# Patient Record
Sex: MALE | Race: WHITE | Employment: FULL TIME | URBAN - METROPOLITAN AREA
[De-identification: names, ages, dates, MRNs, and addresses within clinical notes are randomized per-mention and may not be internally consistent; named-entity substitution may affect disease eponyms.]

---

## 2022-10-28 ENCOUNTER — APPOINTMENT (OUTPATIENT)
Dept: GENERAL RADIOLOGY | Age: 42
End: 2022-10-28
Payer: COMMERCIAL

## 2022-10-28 ENCOUNTER — HOSPITAL ENCOUNTER (OUTPATIENT)
Age: 42
Setting detail: OBSERVATION
Discharge: HOME | End: 2022-10-30
Attending: EMERGENCY MEDICINE | Admitting: INTERNAL MEDICINE
Payer: COMMERCIAL

## 2022-10-28 DIAGNOSIS — R73.9 HYPERGLYCEMIA: ICD-10-CM

## 2022-10-28 DIAGNOSIS — R07.9 CHEST PAIN, UNSPECIFIED TYPE: Primary | ICD-10-CM

## 2022-10-28 DIAGNOSIS — E78.00 HIGH CHOLESTEROL: ICD-10-CM

## 2022-10-28 DIAGNOSIS — I10 HYPERTENSION, UNSPECIFIED TYPE: ICD-10-CM

## 2022-10-28 DIAGNOSIS — Z72.0 TOBACCO ABUSE: ICD-10-CM

## 2022-10-28 LAB
A/G RATIO: 1.9 (ref 1.1–2.2)
ALBUMIN SERPL-MCNC: 4.3 G/DL (ref 3.4–5)
ALP BLD-CCNC: 63 U/L (ref 40–129)
ALT SERPL-CCNC: 16 U/L (ref 10–40)
ANION GAP SERPL CALCULATED.3IONS-SCNC: 12 MMOL/L (ref 3–16)
AST SERPL-CCNC: 18 U/L (ref 15–37)
BASOPHILS ABSOLUTE: 0.1 K/UL (ref 0–0.2)
BASOPHILS RELATIVE PERCENT: 0.5 %
BILIRUB SERPL-MCNC: 0.4 MG/DL (ref 0–1)
BUN BLDV-MCNC: 13 MG/DL (ref 7–20)
CALCIUM SERPL-MCNC: 8.5 MG/DL (ref 8.3–10.6)
CHLORIDE BLD-SCNC: 104 MMOL/L (ref 99–110)
CO2: 23 MMOL/L (ref 21–32)
CREAT SERPL-MCNC: 0.9 MG/DL (ref 0.9–1.3)
EKG ATRIAL RATE: 76 BPM
EKG DIAGNOSIS: NORMAL
EKG P AXIS: 0 DEGREES
EKG P-R INTERVAL: 134 MS
EKG Q-T INTERVAL: 388 MS
EKG QRS DURATION: 96 MS
EKG QTC CALCULATION (BAZETT): 436 MS
EKG R AXIS: 6 DEGREES
EKG T AXIS: 1 DEGREES
EKG VENTRICULAR RATE: 76 BPM
EOSINOPHILS ABSOLUTE: 0 K/UL (ref 0–0.6)
EOSINOPHILS RELATIVE PERCENT: 0.4 %
GFR SERPL CREATININE-BSD FRML MDRD: >60 ML/MIN/{1.73_M2}
GLUCOSE BLD-MCNC: 125 MG/DL (ref 70–99)
HCT VFR BLD CALC: 47.6 % (ref 40.5–52.5)
HEMOGLOBIN: 16.2 G/DL (ref 13.5–17.5)
INFLUENZA A: NOT DETECTED
INFLUENZA B: NOT DETECTED
LYMPHOCYTES ABSOLUTE: 1.9 K/UL (ref 1–5.1)
LYMPHOCYTES RELATIVE PERCENT: 17.9 %
MCH RBC QN AUTO: 32.6 PG (ref 26–34)
MCHC RBC AUTO-ENTMCNC: 34 G/DL (ref 31–36)
MCV RBC AUTO: 95.9 FL (ref 80–100)
MONOCYTES ABSOLUTE: 0.5 K/UL (ref 0–1.3)
MONOCYTES RELATIVE PERCENT: 4.9 %
NEUTROPHILS ABSOLUTE: 8.2 K/UL (ref 1.7–7.7)
NEUTROPHILS RELATIVE PERCENT: 76.3 %
PDW BLD-RTO: 12.8 % (ref 12.4–15.4)
PLATELET # BLD: 239 K/UL (ref 135–450)
PMV BLD AUTO: 8.7 FL (ref 5–10.5)
POTASSIUM REFLEX MAGNESIUM: 3.7 MMOL/L (ref 3.5–5.1)
PRO-BNP: 25 PG/ML (ref 0–124)
RBC # BLD: 4.97 M/UL (ref 4.2–5.9)
REASON FOR REJECTION: NORMAL
REJECTED TEST: NORMAL
SARS-COV-2 RNA, RT PCR: NOT DETECTED
SODIUM BLD-SCNC: 139 MMOL/L (ref 136–145)
TOTAL PROTEIN: 6.6 G/DL (ref 6.4–8.2)
TROPONIN: <0.01 NG/ML
TROPONIN: <0.01 NG/ML
WBC # BLD: 10.7 K/UL (ref 4–11)

## 2022-10-28 PROCEDURE — 84484 ASSAY OF TROPONIN QUANT: CPT

## 2022-10-28 PROCEDURE — 99285 EMERGENCY DEPT VISIT HI MDM: CPT

## 2022-10-28 PROCEDURE — G0378 HOSPITAL OBSERVATION PER HR: HCPCS

## 2022-10-28 PROCEDURE — 80053 COMPREHEN METABOLIC PANEL: CPT

## 2022-10-28 PROCEDURE — 87636 SARSCOV2 & INF A&B AMP PRB: CPT

## 2022-10-28 PROCEDURE — 6370000000 HC RX 637 (ALT 250 FOR IP): Performed by: PHYSICIAN ASSISTANT

## 2022-10-28 PROCEDURE — 2580000003 HC RX 258: Performed by: NURSE PRACTITIONER

## 2022-10-28 PROCEDURE — 83880 ASSAY OF NATRIURETIC PEPTIDE: CPT

## 2022-10-28 PROCEDURE — 93010 ELECTROCARDIOGRAM REPORT: CPT | Performed by: INTERNAL MEDICINE

## 2022-10-28 PROCEDURE — 6370000000 HC RX 637 (ALT 250 FOR IP): Performed by: NURSE PRACTITIONER

## 2022-10-28 PROCEDURE — 85025 COMPLETE CBC W/AUTO DIFF WBC: CPT

## 2022-10-28 PROCEDURE — 71045 X-RAY EXAM CHEST 1 VIEW: CPT

## 2022-10-28 PROCEDURE — 93005 ELECTROCARDIOGRAM TRACING: CPT | Performed by: EMERGENCY MEDICINE

## 2022-10-28 RX ORDER — NICOTINE 21 MG/24HR
1 PATCH, TRANSDERMAL 24 HOURS TRANSDERMAL DAILY
Status: DISCONTINUED | OUTPATIENT
Start: 2022-10-28 | End: 2022-10-30 | Stop reason: HOSPADM

## 2022-10-28 RX ORDER — ATORVASTATIN CALCIUM 10 MG/1
10 TABLET, FILM COATED ORAL DAILY
Status: ON HOLD | COMMUNITY
End: 2022-10-30 | Stop reason: HOSPADM

## 2022-10-28 RX ORDER — SODIUM CHLORIDE 9 MG/ML
INJECTION, SOLUTION INTRAVENOUS PRN
Status: DISCONTINUED | OUTPATIENT
Start: 2022-10-28 | End: 2022-10-30 | Stop reason: HOSPADM

## 2022-10-28 RX ORDER — ACETAMINOPHEN 650 MG/1
650 SUPPOSITORY RECTAL EVERY 6 HOURS PRN
Status: DISCONTINUED | OUTPATIENT
Start: 2022-10-28 | End: 2022-10-30 | Stop reason: HOSPADM

## 2022-10-28 RX ORDER — ATENOLOL 25 MG/1
50 TABLET ORAL DAILY
Status: DISCONTINUED | OUTPATIENT
Start: 2022-10-28 | End: 2022-10-30 | Stop reason: HOSPADM

## 2022-10-28 RX ORDER — PAROXETINE HYDROCHLORIDE 20 MG/1
40 TABLET, FILM COATED ORAL EVERY MORNING
Status: DISCONTINUED | OUTPATIENT
Start: 2022-10-29 | End: 2022-10-30 | Stop reason: HOSPADM

## 2022-10-28 RX ORDER — PILOCARPINE HYDROCHLORIDE 12.5 MG/ML
SOLUTION/ DROPS OPHTHALMIC
COMMUNITY

## 2022-10-28 RX ORDER — SODIUM CHLORIDE 0.9 % (FLUSH) 0.9 %
5-40 SYRINGE (ML) INJECTION EVERY 12 HOURS SCHEDULED
Status: DISCONTINUED | OUTPATIENT
Start: 2022-10-28 | End: 2022-10-30 | Stop reason: HOSPADM

## 2022-10-28 RX ORDER — AMMONIUM LACTATE 12 G/100G
LOTION TOPICAL PRN
COMMUNITY

## 2022-10-28 RX ORDER — SODIUM CHLORIDE 0.9 % (FLUSH) 0.9 %
5-40 SYRINGE (ML) INJECTION PRN
Status: DISCONTINUED | OUTPATIENT
Start: 2022-10-28 | End: 2022-10-30 | Stop reason: HOSPADM

## 2022-10-28 RX ORDER — DIPHENHYDRAMINE HCL 25 MG
25 TABLET ORAL EVERY 6 HOURS PRN
Status: DISCONTINUED | OUTPATIENT
Start: 2022-10-28 | End: 2022-10-30 | Stop reason: HOSPADM

## 2022-10-28 RX ORDER — ASPIRIN 325 MG
325 TABLET ORAL ONCE
Status: COMPLETED | OUTPATIENT
Start: 2022-10-28 | End: 2022-10-28

## 2022-10-28 RX ORDER — ATORVASTATIN CALCIUM 10 MG/1
10 TABLET, FILM COATED ORAL DAILY
Status: DISCONTINUED | OUTPATIENT
Start: 2022-10-28 | End: 2022-10-29

## 2022-10-28 RX ORDER — PAROXETINE HYDROCHLORIDE 40 MG/1
40 TABLET, FILM COATED ORAL EVERY MORNING
COMMUNITY

## 2022-10-28 RX ORDER — POLYETHYLENE GLYCOL 3350 17 G/17G
17 POWDER, FOR SOLUTION ORAL DAILY PRN
Status: DISCONTINUED | OUTPATIENT
Start: 2022-10-28 | End: 2022-10-30 | Stop reason: HOSPADM

## 2022-10-28 RX ORDER — ACETAMINOPHEN 325 MG/1
650 TABLET ORAL EVERY 6 HOURS PRN
Status: DISCONTINUED | OUTPATIENT
Start: 2022-10-28 | End: 2022-10-30 | Stop reason: HOSPADM

## 2022-10-28 RX ORDER — ASPIRIN 81 MG/1
81 TABLET, CHEWABLE ORAL DAILY
Status: DISCONTINUED | OUTPATIENT
Start: 2022-10-29 | End: 2022-10-30 | Stop reason: HOSPADM

## 2022-10-28 RX ORDER — ATENOLOL 50 MG/1
50 TABLET ORAL DAILY
COMMUNITY

## 2022-10-28 RX ORDER — ONDANSETRON 4 MG/1
4 TABLET, ORALLY DISINTEGRATING ORAL EVERY 8 HOURS PRN
Status: DISCONTINUED | OUTPATIENT
Start: 2022-10-28 | End: 2022-10-30 | Stop reason: HOSPADM

## 2022-10-28 RX ORDER — ONDANSETRON 2 MG/ML
4 INJECTION INTRAMUSCULAR; INTRAVENOUS EVERY 6 HOURS PRN
Status: DISCONTINUED | OUTPATIENT
Start: 2022-10-28 | End: 2022-10-30 | Stop reason: HOSPADM

## 2022-10-28 RX ADMIN — NITROGLYCERIN 0.5 INCH: 20 OINTMENT TOPICAL at 18:15

## 2022-10-28 RX ADMIN — DIPHENHYDRAMINE HCL 25 MG: 25 TABLET ORAL at 22:42

## 2022-10-28 RX ADMIN — SODIUM CHLORIDE, PRESERVATIVE FREE 10 ML: 5 INJECTION INTRAVENOUS at 21:37

## 2022-10-28 RX ADMIN — ATENOLOL 50 MG: 25 TABLET ORAL at 21:32

## 2022-10-28 RX ADMIN — ASPIRIN 325 MG: 325 TABLET ORAL at 18:15

## 2022-10-28 RX ADMIN — ATORVASTATIN CALCIUM 10 MG: 10 TABLET, FILM COATED ORAL at 21:32

## 2022-10-28 ASSESSMENT — ENCOUNTER SYMPTOMS
DIARRHEA: 0
SORE THROAT: 0
VOMITING: 0
EYE DISCHARGE: 0
COUGH: 0
EYE REDNESS: 0
CONSTIPATION: 0
SINUS PRESSURE: 0
SHORTNESS OF BREATH: 0
CHEST TIGHTNESS: 0
NAUSEA: 0
RHINORRHEA: 0
ABDOMINAL PAIN: 0
SINUS PAIN: 0

## 2022-10-28 ASSESSMENT — HEART SCORE
ECG: 0
ECG: 1

## 2022-10-28 ASSESSMENT — PAIN SCALES - GENERAL
PAINLEVEL_OUTOF10: 6
PAINLEVEL_OUTOF10: 0

## 2022-10-28 NOTE — ED NOTES
Patient in bed resting upon room entry for hourly rounding. Patient repositioned for comfort: pillows readjusted around patient, and new warm blankets applied and straightened. No acute signs or symptoms of distress noted at this time. Respiratory status stable and VS WDL. Patient denies any needs, concerns, or complaints at this time. Call light within reach.      Bassam Solis RN  10/28/22 6060

## 2022-10-28 NOTE — ED PROVIDER NOTES
[address] ENCOUNTER        Pt Name: Ricci Schaefer  MRN: [id number]  [place] 1980  Date of evaluation: 10/28/2022  Provider: Maliha De Souza PA-C  PCP: No primary care provider on file. ED Attending: No att. providers found      This patient was seen and evaluated by the attending physician     I have not independently evaluated this patient. CHIEF COMPLAINT       Chief Complaint   Patient presents with    Chest Pain     Started around 1300, mid-chest with pressure. Describes dizziness with nausea. Wife describes the pt as \"looking yellow\". HISTORY OF PRESENT ILLNESS   (Location/Symptom, Timing/Onset, Context/Setting, Quality, Duration, Modifying Factors, Severity)  Note limiting factors. Ricci Schaefer is a 38 y.o. male with a pMH of HTN, HLD, tobacco abuse, pre-diabetes, presents for evaluation of sudden onset of chest pain, described as pressure, midsternal, associated diaphoresis and not feeling right. Symptoms have been intermittent since initial onset, however persistent squeezing tight pain has been felt. No treatment prior to arrival.  Patient advised that he had a stress test in the past but it has been numerous years ago. No recent illness. 3-4 drinks of bourbon daily. Nursing Notes were all reviewed and agreed with or any disagreements were addressed  in the HPI. REVIEW OF SYSTEMS  (2-9 systems for level 4, 10 or more for level 5)     Review of Systems   Constitutional:  Positive for diaphoresis. Negative for chills and fever. HENT: Negative. Negative for congestion, rhinorrhea, sinus pressure, sinus pain and sore throat. Eyes:  Negative for discharge, redness and visual disturbance. Respiratory:  Negative for cough, chest tightness and shortness of breath. Cardiovascular:  Positive for chest pain. Negative for palpitations. Gastrointestinal:  Negative for abdominal pain, constipation, diarrhea, nausea and vomiting. Genitourinary:  Negative for difficulty urinating, dysuria and frequency. Musculoskeletal: Negative. Skin: Negative. Neurological: Negative. Negative for dizziness, weakness, numbness and headaches. Psychiatric/Behavioral: Negative. All other systems reviewed and are negative. Positivesand Pertinent negatives as per HPI. Except as noted above in the ROS, all other systems were reviewed and negative. PAST MEDICAL HISTORY     Past Medical History:   Diagnosis Date    Depression     Hyperlipidemia     Hypertension          SURGICAL HISTORY       Past Surgical History:   Procedure Laterality Date    HERNIA REPAIR           CURRENT MEDICATIONS       Current Discharge Medication List        CONTINUE these medications which have NOT CHANGED    Details   atenolol (TENORMIN) 50 MG tablet Take 50 mg by mouth daily      PARoxetine (PAXIL) 40 MG tablet Take 40 mg by mouth every morning      Pilocarpine HCl (VUITY) 1.25 % SOLN Apply to eye      ammonium lactate (LAC-HYDRIN) 12 % lotion Apply topically as needed for Dry Skin Apply topically as needed. atorvastatin (LIPITOR) 10 MG tablet Take 10 mg by mouth daily               ALLERGIES     Patient has no known allergies. FAMILY HISTORY       Family History   Problem Relation Age of Onset    No Known Problems Mother     High Cholesterol Father     No Known Problems Brother     Lung Disease Maternal Grandfather     Heart Disease Paternal Grandmother          SOCIAL HISTORY       Social History     Socioeconomic History    Marital status:      Spouse name: None    Number of children: None    Years of education: None    Highest education level: None   Tobacco Use    Smoking status: Every Day     Packs/day: 0.50     Types: Cigarettes    Smokeless tobacco: Never   Substance and Sexual Activity    Alcohol use:  Yes     Alcohol/week: 4.0 standard drinks     Types: 4 Shots of liquor per week     Comment: 2 drinks per day-  double shot bourbons per day with a splash of cola (10/28/2022)    Drug use: Never       SCREENINGS     NIH Score       Glascow Lizzie Coma Scale  Eye Opening: Spontaneous  Best Verbal Response: Oriented  Best Motor Response: Obeys commands  Lizzie Coma Scale Score: 15    [facility]     Heart Score  Heart Score for chest pain patients  History: Highly Suspicious  ECG: Non-Specifc repolarization disturbance/LBTB/PM  Patient Age: < 45 years  *Risk factors for Atherosclerotic disease: Cigarette smoking, Diabetes Mellitus, Hypercholesterolemia, Hypertension, Obesity  Risk Factors: > 3 Risk factors or history of atherosclerotic disease*  Troponin: < 1X normal limit  Heart Score Total: 5      PHYSICAL EXAM    (up to 7 for level 4, 8 ormore for level 5)     ED Triage Vitals   BP Temp Temp Source Heart Rate Resp SpO2 Height Weight   10/28/22 1741 10/28/22 1741 10/28/22 1741 10/28/22 1741 10/28/22 1741 10/28/22 1741 10/28/22 1743 10/28/22 1743   (!) 165/98 99 °F (37.2 °C) Oral 76 16 99 % 6' 1\" (1.854 m) 235 lb (106.6 kg)       Physical Exam  Vitals and nursing note reviewed. Constitutional:       Appearance: Normal appearance. He is well-developed. He is not diaphoretic. HENT:      Head: Normocephalic and atraumatic. Nose: Nose normal.      Mouth/Throat:      Mouth: Mucous membranes are moist.      Pharynx: No oropharyngeal exudate. Eyes:      General:         Right eye: No discharge. Left eye: No discharge. Extraocular Movements: Extraocular movements intact. Conjunctiva/sclera: Conjunctivae normal.      Pupils: Pupils are equal, round, and reactive to light. Cardiovascular:      Rate and Rhythm: Normal rate and regular rhythm. Heart sounds: Normal heart sounds. No murmur heard. No friction rub. No gallop. Pulmonary:      Effort: Pulmonary effort is normal. No respiratory distress. Breath sounds: Normal breath sounds. No wheezing.    Abdominal:      General: Bowel sounds are normal. There is no distension. Palpations: Abdomen is soft. Tenderness: There is no abdominal tenderness. Musculoskeletal:         General: Normal range of motion. Cervical back: Normal range of motion. Skin:     General: Skin is warm and dry. Capillary Refill: Capillary refill takes less than 2 seconds. Neurological:      General: No focal deficit present. Mental Status: He is alert and oriented to person, place, and time. Psychiatric:         Mood and Affect: Mood normal.         Behavior: Behavior normal.      Comments: +anxious, diaphoretic        DIAGNOSTIC RESULTS   LABS:    Labs Reviewed   CBC WITH AUTO DIFFERENTIAL - Abnormal; Notable for the following components:       Result Value    Neutrophils Absolute 8.2 (*)     All other components within normal limits   COMPREHENSIVE METABOLIC PANEL W/ REFLEX TO MG FOR LOW K - Abnormal; Notable for the following components:    Glucose 125 (*)     All other components within normal limits   BASIC METABOLIC PANEL - Abnormal; Notable for the following components:    Glucose 115 (*)     All other components within normal limits   COVID-19 & INFLUENZA COMBO   SPECIMEN REJECTION    Narrative:     CALL  [facility] [phone],  Rejected Test Name/Called to: VELMA Ramirez, 10/28/2022 17:49, by ARON   TROPONIN   BRAIN NATRIURETIC PEPTIDE   TROPONIN   TROPONIN   TROPONIN   CBC   LIPID PANEL   HEMOGLOBIN A1C       All other labs were within normal range or notreturned as of this dictation. EKG: All EKG's are interpreted by the Emergency Department Physician who either signs or Co-signs this chart in the absence of a cardiologist.  Please see their note for interpretation of EKG. RADIOLOGY:   Interpretation per the Radiologist below, if available at the time of this note:    XR CHEST PORTABLE   Final Result   No radiographic evidence of an acute cardiopulmonary process.          NM Cardiac Stress Test Nuclear Imaging    (Results Pending)     XR CHEST PORTABLE    Result Date: 10/28/2022  EXAMINATION: ONE XRAY VIEW OF THE CHEST 10/28/2022 2:00 pm COMPARISON: None. HISTORY: ORDERING SYSTEM PROVIDED HISTORY: Chest Pain TECHNOLOGIST PROVIDED HISTORY: Reason for exam:->Chest Pain Reason for Exam: cp FINDINGS: The lungs and costophrenic angles are clear. The cardiomediastinal silhouette and pulmonary vessels appear normal.     No radiographic evidence of an acute cardiopulmonary process. Is this patient to be included in the SEP-1 Core Measure due to severe sepsis or septic shock?    No   Exclusion criteria - the patient is NOT to be included for SEP-1 Core Measure due to:  2+ SIRS criteria are not met     CONSULTS:  Hospitalist, accepts the patient      EMERGENCY DEPARTMENT COURSE and DIFFERENTIAL DIAGNOSIS/MDM:   Vitals:    Vitals:    10/29/22 0400 10/29/22 0539 10/29/22 0738 10/29/22 1218   BP:   120/84 136/89   Pulse:   62 70   Resp:   18    Temp:   98.1 °F (36.7 °C) 99 °F (37.2 °C)   TempSrc:   Oral Oral   SpO2:   100% 100%   Weight: 235 lb (106.6 kg) 235 lb (106.6 kg)     Height:           Patient was given the following medications:  Medications   atenolol (TENORMIN) tablet 50 mg (50 mg Oral Given 10/29/22 1031)   PARoxetine (PAXIL) tablet 40 mg (40 mg Oral Given 10/29/22 1032)   sodium chloride flush 0.9 % injection 5-40 mL (10 mLs IntraVENous Given 10/29/22 1034)   sodium chloride flush 0.9 % injection 5-40 mL (has no administration in time range)   0.9 % sodium chloride infusion (has no administration in time range)   ondansetron (ZOFRAN-ODT) disintegrating tablet 4 mg (has no administration in time range)     Or   ondansetron (ZOFRAN) injection 4 mg (has no administration in time range)   acetaminophen (TYLENOL) tablet 650 mg (650 mg Oral Given 10/29/22 0216)     Or   acetaminophen (TYLENOL) suppository 650 mg ( Rectal See Alternative 10/29/22 0216)   polyethylene glycol (GLYCOLAX) packet 17 g (has no administration in time range)   aspirin chewable tablet 81 mg (81 mg Oral Given 10/29/22 1032)   perflutren lipid microspheres (DEFINITY) injection 1.65 mg (has no administration in time range)   nicotine (NICODERM CQ) 14 MG/24HR 1 patch (1 patch TransDERmal Patch Applied 10/29/22 1033)   diphenhydrAMINE (BENADRYL) tablet 25 mg (25 mg Oral Given 10/28/22 2242)   pantoprazole (PROTONIX) tablet 40 mg (40 mg Oral Given 10/29/22 1031)   atorvastatin (LIPITOR) tablet 40 mg (has no administration in time range)   aspirin tablet 325 mg (325 mg Oral Given 10/28/22 1815)   nitroglycerin (NITRO-BID) 2 % ointment 0.5 inch (0.5 inches Topical Given 10/28/22 1815)   technetium tetrofosmin (Tc-MYOVIEW) injection 21.3 millicurie (15.4 millicuries IntraVENous Given 10/29/22 0800)   technetium tetrofosmin (Tc-MYOVIEW) injection 97.6 millicurie (71.5 millicuries IntraVENous Given 10/29/22 0915)     Evaluation of patient's past cardiac work-up includes a normal echo in 2014. Also per cardiologist note patient had a normal exercise stress test in 2014 however I am unable to find a report of this in care  everywhere. Afebrile, stable, patient presents to the ED for evaluation. Seen in conjunction with attending ED provider who agrees with assessment and plan. Patients SPO2 is 99% on room air they are not hypoxic. EKG shows normal sinus rhythm nonspecific T wave abnormality. Chest x-ray shows no evidence of any acute findings. Negative influenza negative COVID. Labs reveal no leukocytosis or anemia, no electrolyte abnormalities with the exception of hyperglycemia with a glucose of 125. No elevation in troponin or BNP. Patient was provided with aspirin and nitro ointment, which helps improve his symptoms. Due to patient's presentation of symptoms. I feel that he would benefit from admission for home telemetry monitoring and stress testing. His heart score is 5. Therefore consult to the hospitalist for admission of the patient in stable condition.   All questions are answered. The patient tolerated their visit well. They were seen and evaluated by the attendingphysician, No att. providers found who agreed with the assessment and plan. The patient and / or the family were informed of the results of any tests, a time was given to answer questions, a plan was proposed and they agreed Rodney Moy. FINAL IMPRESSION      1. Chest pain, unspecified type    2. Hypertension, unspecified type    3. High cholesterol    4. Tobacco abuse    5. Hyperglycemia          DISPOSITION/PLAN   DISPOSITION Admitted 10/28/2022 07:54:12 PM      PATIENT REFERRED TO:  No follow-up provider specified. DISCHARGE MEDICATIONS:  Current Discharge Medication List          DISCONTINUED MEDICATIONS:  Current Discharge Medication List                 Pt was seen during the COVID 19 pandemic. Appropriate PPE worn by ME during patient encounters. Pt seen during a time with constrained hospital bed capacity and other potential inpatient and outpatient resources were constrained due to the viral pandemic.    Please note that portions of this note were completed with a voice recognition program.  Efforts were made to edit the dictations but occasionally words are mis-transcribed.)    Evelyne Vega PA-C (electronically signed)        Evelyne Vega PA-C  10/29/22 Montez Nice PA-C  10/29/22 1448

## 2022-10-29 ENCOUNTER — APPOINTMENT (OUTPATIENT)
Dept: NUCLEAR MEDICINE | Age: 42
End: 2022-10-29
Payer: COMMERCIAL

## 2022-10-29 PROBLEM — F10.20 ALCOHOL DEPENDENCE, DAILY USE (HCC): Status: ACTIVE | Noted: 2022-10-29

## 2022-10-29 PROBLEM — I10 HYPERTENSION: Status: ACTIVE | Noted: 2022-10-29

## 2022-10-29 PROBLEM — E78.00 HIGH CHOLESTEROL: Status: ACTIVE | Noted: 2022-10-29

## 2022-10-29 PROBLEM — Z72.0 NICOTINE USE: Status: ACTIVE | Noted: 2022-10-29

## 2022-10-29 LAB
ANION GAP SERPL CALCULATED.3IONS-SCNC: 11 MMOL/L (ref 3–16)
BUN BLDV-MCNC: 11 MG/DL (ref 7–20)
CALCIUM SERPL-MCNC: 9.4 MG/DL (ref 8.3–10.6)
CHLORIDE BLD-SCNC: 104 MMOL/L (ref 99–110)
CO2: 26 MMOL/L (ref 21–32)
CREAT SERPL-MCNC: 1 MG/DL (ref 0.9–1.3)
EKG ATRIAL RATE: 62 BPM
EKG DIAGNOSIS: NORMAL
EKG P AXIS: -4 DEGREES
EKG P-R INTERVAL: 124 MS
EKG Q-T INTERVAL: 406 MS
EKG QRS DURATION: 94 MS
EKG QTC CALCULATION (BAZETT): 412 MS
EKG R AXIS: 21 DEGREES
EKG T AXIS: 20 DEGREES
EKG VENTRICULAR RATE: 62 BPM
GFR SERPL CREATININE-BSD FRML MDRD: >60 ML/MIN/{1.73_M2}
GLUCOSE BLD-MCNC: 115 MG/DL (ref 70–99)
LV EF: 65 %
LVEF MODALITY: NORMAL
POTASSIUM SERPL-SCNC: 4.3 MMOL/L (ref 3.5–5.1)
SODIUM BLD-SCNC: 141 MMOL/L (ref 136–145)
TROPONIN: <0.01 NG/ML

## 2022-10-29 PROCEDURE — 99205 OFFICE O/P NEW HI 60 MIN: CPT | Performed by: INTERNAL MEDICINE

## 2022-10-29 PROCEDURE — 93005 ELECTROCARDIOGRAM TRACING: CPT | Performed by: NURSE PRACTITIONER

## 2022-10-29 PROCEDURE — 36415 COLL VENOUS BLD VENIPUNCTURE: CPT

## 2022-10-29 PROCEDURE — 80048 BASIC METABOLIC PNL TOTAL CA: CPT

## 2022-10-29 PROCEDURE — 6370000000 HC RX 637 (ALT 250 FOR IP): Performed by: INTERNAL MEDICINE

## 2022-10-29 PROCEDURE — 6370000000 HC RX 637 (ALT 250 FOR IP): Performed by: NURSE PRACTITIONER

## 2022-10-29 PROCEDURE — 84484 ASSAY OF TROPONIN QUANT: CPT

## 2022-10-29 PROCEDURE — 93017 CV STRESS TEST TRACING ONLY: CPT

## 2022-10-29 PROCEDURE — G0378 HOSPITAL OBSERVATION PER HR: HCPCS

## 2022-10-29 PROCEDURE — 3430000000 HC RX DIAGNOSTIC RADIOPHARMACEUTICAL: Performed by: NURSE PRACTITIONER

## 2022-10-29 PROCEDURE — 78452 HT MUSCLE IMAGE SPECT MULT: CPT

## 2022-10-29 PROCEDURE — 93010 ELECTROCARDIOGRAM REPORT: CPT | Performed by: INTERNAL MEDICINE

## 2022-10-29 PROCEDURE — 2580000003 HC RX 258: Performed by: NURSE PRACTITIONER

## 2022-10-29 PROCEDURE — A9502 TC99M TETROFOSMIN: HCPCS | Performed by: NURSE PRACTITIONER

## 2022-10-29 RX ORDER — PANTOPRAZOLE SODIUM 40 MG/1
40 TABLET, DELAYED RELEASE ORAL
Status: DISCONTINUED | OUTPATIENT
Start: 2022-10-29 | End: 2022-10-30 | Stop reason: HOSPADM

## 2022-10-29 RX ORDER — ATORVASTATIN CALCIUM 40 MG/1
40 TABLET, FILM COATED ORAL DAILY
Status: DISCONTINUED | OUTPATIENT
Start: 2022-10-30 | End: 2022-10-30 | Stop reason: HOSPADM

## 2022-10-29 RX ADMIN — PANTOPRAZOLE SODIUM 40 MG: 40 TABLET, DELAYED RELEASE ORAL at 10:31

## 2022-10-29 RX ADMIN — PAROXETINE HYDROCHLORIDE 40 MG: 20 TABLET, FILM COATED ORAL at 10:32

## 2022-10-29 RX ADMIN — TETROFOSMIN 30.3 MILLICURIE: 1.38 INJECTION, POWDER, LYOPHILIZED, FOR SOLUTION INTRAVENOUS at 09:15

## 2022-10-29 RX ADMIN — SODIUM CHLORIDE, PRESERVATIVE FREE 10 ML: 5 INJECTION INTRAVENOUS at 10:34

## 2022-10-29 RX ADMIN — ASPIRIN 81 MG 81 MG: 81 TABLET ORAL at 10:32

## 2022-10-29 RX ADMIN — ATENOLOL 50 MG: 25 TABLET ORAL at 10:31

## 2022-10-29 RX ADMIN — SODIUM CHLORIDE, PRESERVATIVE FREE 10 ML: 5 INJECTION INTRAVENOUS at 21:13

## 2022-10-29 RX ADMIN — ACETAMINOPHEN 650 MG: 325 TABLET ORAL at 02:16

## 2022-10-29 RX ADMIN — TETROFOSMIN 11.7 MILLICURIE: 1.38 INJECTION, POWDER, LYOPHILIZED, FOR SOLUTION INTRAVENOUS at 08:00

## 2022-10-29 ASSESSMENT — PAIN SCALES - GENERAL
PAINLEVEL_OUTOF10: 0
PAINLEVEL_OUTOF10: 7
PAINLEVEL_OUTOF10: 0

## 2022-10-29 ASSESSMENT — PAIN DESCRIPTION - DESCRIPTORS: DESCRIPTORS: ACHING

## 2022-10-29 ASSESSMENT — PAIN DESCRIPTION - LOCATION: LOCATION: HEAD

## 2022-10-29 NOTE — ED NOTES
Report called to nurse assuming inpatient care and responsibility for patient. All questions answered per protocol. Nurse receiving report expresses no further concerns or questions. Transport to be completed by ED staff.      Maicol Verdugo RN  10/28/22 2024

## 2022-10-29 NOTE — PROGRESS NOTES
Paged Dr. Roxi Hall regarding results of stress test. Return call from MD. Patient will stay until Monday for angiogram. Patient made aware.

## 2022-10-29 NOTE — CONSULTS
[facility]   Cardiology Consultation   Date: 10/29/2022  Reason for Consultation: Chest pain  Consult Requesting Physician: Ethel Elliott MD     Chief Complaint   Patient presents with    Chest Pain     Started around 1300, mid-chest with pressure. Describes dizziness with nausea. Wife describes the pt as \"looking yellow\". HPI: Ricci Schaefer is a 38 y.o. male with history of hypertension, hyperlipidemia, depression, smoker, was admitted due to chest pain. Patient was having discussion with a friend which became emotional and started having chest pressure in the middle of his sternum which lasted for a few hours. Pain would wax and wane through the day. His grandmother had a heart attack in her 46s. Troponins were negative. There was nonspecific changes on the EKG with ST ST segments. Past Medical History:   Diagnosis Date    Depression     Hyperlipidemia     Hypertension         Past Surgical History:   Procedure Laterality Date    HERNIA REPAIR         No Known Allergies    Social History:  Reviewed. reports that he has been smoking cigarettes. He has been smoking an average of .5 packs per day. He has never used smokeless tobacco. He reports current alcohol use of about 4.0 standard drinks per week. He reports that he does not use drugs. Family History:  Reviewed. family history includes Heart Disease in his paternal grandmother; High Cholesterol in his father; Lung Disease in his maternal grandfather; No Known Problems in his brother and mother. Review of System:  All other systems reviewed and are negative except for that noted above.  Pertinent negatives are:     General: negative for fever, chills   Ophthalmic ROS: negative for - eye pain or loss of vision  ENT ROS: negative for - headaches, sore throat   Respiratory: negative for - cough, sputum  Cardiovascular: Reviewed in HPI  Gastrointestinal: negative for - abdominal pain, diarrhea, N/V  Hematology: negative for - bleeding, blood clots, bruising or jaundice  Genito-Urinary:  negative for - Dysuria or incontinence  Musculoskeletal: negative for - Joint swelling, muscle pain  Neurological: negative for - confusion, dizziness, headaches   Psychiatric: No anxiety, no depression. Dermatological: negative for - rash    Physical Examination:  Vitals:    10/29/22 0000   BP: 117/80   Pulse: 59   Resp: 18   Temp: 98 °F (36.7 °C)   SpO2: 98%      No intake/output data recorded. Wt Readings from Last 3 Encounters:   10/29/22 235 lb (106.6 kg)     Temp  Av.7 °F (37.1 °C)  Min: 98 °F (36.7 °C)  Max: 99.2 °F (37.3 °C)  Pulse  Av.4  Min: 59  Max: 76  BP  Min: 117/80  Max: 165/98  SpO2  Av.4 %  Min: 97 %  Max: 99 %  No intake or output data in the 24 hours ending 10/29/22 0738    Telemetry: Sinus rhythm   Constitutional: Oriented. No distress. Head: Normocephalic and atraumatic. Mouth/Throat: Oropharynx is clear and moist.   Eyes: Conjunctivae normal. EOM are normal.   Neck: Neck supple. No rigidity. No JVD present. Cardiovascular: Normal rate, regular rhythm, S1&S2. Pulmonary/Chest: Bilateral respiratory sounds. No wheezes, No rhonchi. Abdominal: Soft. Bowel sounds present. No distension, No tenderness. Musculoskeletal: No tenderness. No edema    Lymphadenopathy: Has no cervical adenopathy. Neurological: Alert and oriented. Cranial nerve appears intact, No Gross deficit   Skin: Skin is warm and dry. No rash noted. Psychiatric: Has a normal behavior     Labs, diagnostic and imaging results reviewed. Reviewed.    Recent Labs     10/28/22  1751      K 3.7      CO2 23   BUN 13   CREATININE 0.9     Recent Labs     10/28/22  1707   WBC 10.7   HGB 16.2   HCT 47.6   MCV 95.9        Lab Results   Component Value Date/Time    TROPONINI <0.01 10/29/2022 12:40 AM     No results found for: BNP  No results found for: PROTIME, INR  No results found for: CHOL, HDL, TRIG    ECG: Normal sinus rhythmNonspecific T wave abnormality  Echo:   Cath:     Scheduled Meds:   atenolol  50 mg Oral Daily    atorvastatin  10 mg Oral Daily    PARoxetine  40 mg Oral QAM    sodium chloride flush  5-40 mL IntraVENous 2 times per day    aspirin  81 mg Oral Daily    nicotine  1 patch TransDERmal Daily     Continuous Infusions:   sodium chloride       PRN Meds:.sodium chloride flush, sodium chloride, ondansetron **OR** ondansetron, acetaminophen **OR** acetaminophen, polyethylene glycol, perflutren lipid microspheres, diphenhydrAMINE     Patient Active Problem List    Diagnosis Date Noted    Alcohol dependence, daily use ([facility] [address] 10/29/2022    Nicotine use 10/29/2022    Chest pain 10/28/2022      Active Hospital Problems    Diagnosis Date Noted    [facility] [F10.20] 10/29/2022     Priority: Medium    Nicotine use [Z72.0] 10/29/2022     Priority: Medium    Chest pain [R07.9] 10/28/2022     Priority: Medium       Assessment:       Plan:    -Chest pain    Patient has multiple risk factors for coronary disease including smoking, hypertension, hyperlipidemia. We will proceed with a stress test.    If the stress test is negative, patient can be discharged home with follow-up in cardiology office for primary prevention. If the stress test is abnormal, we will keep him for coronary angiography on Monday.    -Hypertension    BP is well controlled. Continue current meds. -Hyperlipidemia    We will follow-up lipid panel. May have to increase the dose of atorvastatin depending on the LDL level.    -Nicotine abuse    Discussed cessation of smoking. He started to use a nicotine patch. Thank you for allowing me to participate in the care of Sussy Oakley     NOTE: This report was transcribed using voice recognition software. Every effort was made to ensure accuracy, however, inadvertent computerized transcription errors may be present.

## 2022-10-29 NOTE — H&P
Hospital Medicine History & Physical      PCP: No primary care provider on file. Date of Admission: 10/28/2022    Time of Service: 2130    Date of Service: Pt seen/examined on 10/28/2022 and placed in observation. Historian: Self, ED documentation    Chief Concern:    Chief Complaint   Patient presents with    Chest Pain     Started around 1300, mid-chest with pressure. Describes dizziness with nausea. Wife describes the pt as \"looking yellow\". History Of Present Illness:      Leyla Ambrosio is a 26-year-old male with significant past medical history of hypertension, hyperlipidemia, and depression who presents to [facility] emergency department with concerns for an episode of chest pain that occurred today. He states that he was at home, developed an acute onset of chest pressure around noon, substernal, nonradiating, felt like a pressure sensation pushing down to his chest, with no associated symptoms such as tachycardia, palpitations, or dizziness. He rated the pain/pressure at about a 5 out of 10, would wax and wane throughout the day, but was able to continue on with his daily tasks such as going  the kids. He states when he got back home, his wife was home as well, told her that his chest felt funny, at that time the pain was waning to about a 2 out of 10 but felt compelled to come here for an evaluation. His evaluation here included laboratory studies, EKG, and chest x-ray. Chest x-ray was negative for any acute findings. EKG showed sinus rhythm without any acute ST segment T wave deviations. Pertinent lab values show an unremarkable chemistry panel, negative serial troponin checks, and proBNP of 25. Cell count was also unremarkable for any acute leukocytosis/leukopenia, acute anemia, or acute platelet abnormalities. Flu and COVID testing were both negative as well. Patient received aspirin emergency department.   Hospitalist consulted to place this patient in observation for chest pain rule out ACS. Past Medical History:          Diagnosis Date    Depression     Hyperlipidemia     Hypertension        Past Surgical History:          Procedure Laterality Date    HERNIA REPAIR         Medications Prior to Admission:      Prior to Admission medications    Medication Sig Start Date End Date Taking? Authorizing Provider   atenolol (TENORMIN) 50 MG tablet Take 50 mg by mouth daily   Yes Historical Provider, MD   PARoxetine (PAXIL) 40 MG tablet Take 40 mg by mouth every morning   Yes Historical Provider, MD   Pilocarpine HCl (VUITY) 1.25 % SOLN Apply to eye   Yes Historical Provider, MD   ammonium lactate (LAC-HYDRIN) 12 % lotion Apply topically as needed for Dry Skin Apply topically as needed. Yes Historical Provider, MD   atorvastatin (LIPITOR) 10 MG tablet Take 10 mg by mouth daily   Yes Historical Provider, MD       Allergies:  Patient has no known allergies. Social History:      The patient currently lives at home with family. TOBACCO:   reports that he has been smoking cigarettes. He has been smoking an average of .5 packs per day. He has never used smokeless tobacco.  ETOH:   reports current alcohol use of about 4.0 standard drinks per week. E-cigarette/Vaping       Questions Responses    E-cigarette/Vaping Use Never Assessed    Start Date     Passive Exposure     Quit Date     Counseling Given     Comments             Family History:      Reviewed in detail at bedside with patient; positive as follows:        Problem Relation Age of Onset    No Known Problems Mother     High Cholesterol Father     No Known Problems Brother     Lung Disease Maternal Grandfather     Heart Disease Paternal Grandmother        REVIEW OF SYSTEMS COMPLETED:   Pertinent positives as noted in the HPI. All other systems reviewed and negative.     PHYSICAL EXAM PERFORMED:    /80   Pulse 59   Temp 98 °F (36.7 °C) (Oral)   Resp 18   Ht 6' 1\" (1.854 m)   Wt 235 lb (106.6 kg) SpO2 98%   BMI 31.00 kg/m²     General appearance:  No apparent distress, appears stated age and cooperative. HEENT:  Normal cephalic, atraumatic without obvious deformity. Pupils equal, round, and reactive to light. Extra ocular muscles intact. Conjunctivae/corneas clear. Neck: Supple, with full range of motion. No jugular venous distention. Trachea midline. Respiratory:  Normal respiratory effort. Clear to auscultation, bilaterally without Rales/Wheezes/Rhonchi. Cardiovascular:  Regular rate and rhythm with normal S1/S2 without murmurs, rubs or gallops. Abdomen: Soft, non-tender, non-distended with normal bowel sounds. Musculoskeletal:  No clubbing, cyanosis or edema bilaterally. Full range of motion without deformity. Skin: Skin color, texture, turgor normal.  No rashes or lesions. Neurologic:  Neurovascularly intact without any focal sensory/motor deficits. Cranial nerves: II-XII intact, grossly non-focal.  Psychiatric:  Alert and oriented, thought content appropriate, normal insight  Capillary Refill: Brisk,3 seconds, normal  Peripheral Pulses: +2 palpable, equal bilaterally       Labs:     Recent Labs     10/28/22  1707   WBC 10.7   HGB 16.2   HCT 47.6        Recent Labs     10/28/22  1751      K 3.7      CO2 23   BUN 13   CREATININE 0.9   CALCIUM 8.5     Recent Labs     10/28/22  1751   AST 18   ALT 16   BILITOT 0.4   ALKPHOS 63     No results for input(s): INR in the last 72 hours. Recent Labs     10/28/22  1751 10/28/22  2015 10/29/22  0040   TROPONINI <0.01 <0.01 <0.01     Radiology:     I have reviewed the radiographic results for this encounter with the following interpretation(s); see report(s) below:    XR CHEST PORTABLE   Final Result   No radiographic evidence of an acute cardiopulmonary process.          NM Cardiac Stress Test Nuclear Imaging    (Results Pending)       EKG:  I have reviewed the EKG with the following interpretation in the absence of a cardiologist: Non-acute EKG, NSR, no acute ST-segment or T-wave deviations. Consults:    IP CONSULT TO CARDIOLOGY    ASSESSMENT:    Active Hospital Problems    Diagnosis Date Noted    Chest pain [R07.9] 10/28/2022     Priority: High    Alcohol dependence, daily use ([facility] [address] [F10.20] 10/29/2022     Priority: Medium    Nicotine use [Z72.0] 10/29/2022     Priority: Medium       PLAN:    Chest Pain  - Pain-free currently  - Continue troponin checks  - Consulted Cardiology for AM  - Ordered echocardiogram and stress testing    Chronic Alcohol Use  - Drinks 2 double bourbons with cola daily before bed  - Denies previous alcohol treatment or concerns for withdrawal symptoms while here, last drink last night  - Discussed alcohol intake is excessive, denies concerns, states it helps him sleep  - CIWA protocol placed    Hypertension  - BP controlled  - Continue home antihypertensives    Hyperlipidemia  - Continue statin therapy  - Check lipid panel in AM    Nicotine Use  - 1/2 ppd  - Nicoderm patch ordered, if needed    DVT Prophylaxis: SCDs  SRMB Prophylaxis: N/A  Diet: ADULT DIET; Regular; Low Fat/Low Chol/High Fiber/2 gm Na  Code Status: Full Code    PT/OT Eval Status: N/A    Dispo - 1-2 days per clinical improvement    Lynette Segundo, APRN - CNP    Thank you No primary care provider on file. for the opportunity to be involved in this patient's care.  If you have any questions or concerns please feel free to contact me at [phone].---Anticipated co-signer, Dr. Lilia Winchester    (Please note that portions of this note were completed with a voice recognition program. Efforts were made to edit the dictations but occasionally words are mis-transcribed.)

## 2022-10-29 NOTE — PROGRESS NOTES
Consult placed    [phone] [place] [address] cardiology  Date:10/29/2022,  [id number]    Electronically signed by Carmine Caldwell RN on 10/29/2022 at [id number]

## 2022-10-29 NOTE — PROGRESS NOTES
Hospitalist Progress Note      PCP: No primary care provider on file. Date of Admission: 10/28/2022    Chief Complaint: chest pain    Hospital Course: reviewed     Subjective: seen in stress lab and justed completed testing, no complaints currently       Medications:  Reviewed    Infusion Medications    sodium chloride       Scheduled Medications    atenolol  50 mg Oral Daily    atorvastatin  10 mg Oral Daily    PARoxetine  40 mg Oral QAM    sodium chloride flush  5-40 mL IntraVENous 2 times per day    aspirin  81 mg Oral Daily    nicotine  1 patch TransDERmal Daily     PRN Meds: sodium chloride flush, sodium chloride, ondansetron **OR** ondansetron, acetaminophen **OR** acetaminophen, polyethylene glycol, perflutren lipid microspheres, diphenhydrAMINE      Intake/Output Summary (Last 24 hours) at 10/29/2022 0834  Last data filed at 10/29/2022 0741  Gross per 24 hour   Intake 0 ml   Output --   Net 0 ml       Physical Exam Performed:    /84   Pulse 62   Temp 98.1 °F (36.7 °C) (Oral)   Resp 18   Ht 6' 1\" (1.854 m)   Wt 235 lb (106.6 kg)   SpO2 100%   BMI 31.00 kg/m²     General appearance: No apparent distress, appears stated age and cooperative. HEENT: Pupils equal, round, and reactive to light. Conjunctivae/corneas clear. Neck: Supple, with full range of motion. No jugular venous distention. Trachea midline. Respiratory:  Normal respiratory effort. Clear to auscultation, bilaterally without Rales/Wheezes/Rhonchi. Cardiovascular: Regular rate and rhythm with normal S1/S2 without murmurs, rubs or gallops. Abdomen: Soft, non-tender, non-distended with normal bowel sounds. Musculoskeletal: No clubbing, cyanosis or edema bilaterally. Full range of motion without deformity. Skin: Skin color, texture, turgor normal.  No rashes or lesions. Neurologic:  Neurovascularly intact without any focal sensory/motor deficits.  Cranial nerves: II-XII intact, grossly non-focal.  Psychiatric: Alert and oriented, thought content appropriate, normal insight  Capillary Refill: Brisk, 3 seconds, normal   Peripheral Pulses: +2 palpable, equal bilaterally       Labs:   Recent Labs     10/28/22  1707   WBC 10.7   HGB 16.2   HCT 47.6        Recent Labs     10/28/22  1751 10/29/22  0656    141   K 3.7 4.3    104   CO2 23 26   BUN 13 11   CREATININE 0.9 1.0   CALCIUM 8.5 9.4     Recent Labs     10/28/22  1751   AST 18   ALT 16   BILITOT 0.4   ALKPHOS 63     No results for input(s): INR in the last 72 hours. Recent Labs     10/28/22  1751 10/28/22  2015 10/29/22  0040   TROPONINI <0.01 <0.01 <0.01       Urinalysis:    No results found for: Jessica Jan, BACTERIA, RBCUA, BLOODU, SPECGRAV, GLUCOSEU    Radiology:  XR CHEST PORTABLE   Final Result   No radiographic evidence of an acute cardiopulmonary process. NM Cardiac Stress Test Nuclear Imaging    (Results Pending)           Assessment/Plan:    Active Hospital Problems    Diagnosis     Alcohol dependence, daily use ([facility] [address] [F10.20]      Priority: Medium    Nicotine use [Z72.0]      Priority: Medium    Chest pain [R07.9]      Priority: Medium       Chest Pain  - Pain-free currently  - Continued troponin checks  - Consulted Cardiology for AM, apprec recs  - Ordered echocardiogram(cancelled given bnp wnl) and stress testing     Chronic Alcohol Use  - Drinks 2 double bourbons with cola daily before bed  - Denies previous alcohol treatment or concerns for withdrawal symptoms while here, last drink last night  - Discussed alcohol intake is excessive, denies concerns, states it helps him sleep  - CIWA protocol placed   -ppi ordered 10/29    Hypertension  - BP controlled  - Continue home antihypertensives     Hyperlipidemia  - Continue statin therapy  - Check lipid panel in AM     Nicotine Use  - 1/2 ppd  - Nicoderm patch ordered, if needed     DVT Prophylaxis: SCDs  Diet: ADULT DIET;  Regular; Low Fat/Low Chol/High Fiber/2 gm Na  Code Status: Full Code  PT/OT Eval Status: not ordered    Dispo - pending workup    Appropriate for A1 Discharge Unit: No      Joss Li MD

## 2022-10-29 NOTE — PLAN OF CARE
Problem: Safety - Adult  Goal: Free from fall injury  10/29/2022 1027 by Mayuri López RN  Outcome: Progressing     Problem: Pain  Goal: Verbalizes/displays adequate comfort level or baseline comfort level  10/29/2022 1027 by Mayuri López RN  Outcome: Progressing

## 2022-10-30 VITALS
SYSTOLIC BLOOD PRESSURE: 158 MMHG | TEMPERATURE: 97.5 F | DIASTOLIC BLOOD PRESSURE: 105 MMHG | WEIGHT: 222.3 LBS | HEIGHT: 73 IN | HEART RATE: 58 BPM | OXYGEN SATURATION: 99 % | BODY MASS INDEX: 29.46 KG/M2 | RESPIRATION RATE: 16 BRPM

## 2022-10-30 PROBLEM — R94.39 ABNORMAL CARDIOVASCULAR STRESS TEST: Status: ACTIVE | Noted: 2022-10-30

## 2022-10-30 PROCEDURE — 99215 OFFICE O/P EST HI 40 MIN: CPT | Performed by: NURSE PRACTITIONER

## 2022-10-30 PROCEDURE — 6370000000 HC RX 637 (ALT 250 FOR IP): Performed by: INTERNAL MEDICINE

## 2022-10-30 PROCEDURE — G0378 HOSPITAL OBSERVATION PER HR: HCPCS

## 2022-10-30 PROCEDURE — 6370000000 HC RX 637 (ALT 250 FOR IP): Performed by: NURSE PRACTITIONER

## 2022-10-30 PROCEDURE — 2580000003 HC RX 258: Performed by: NURSE PRACTITIONER

## 2022-10-30 RX ORDER — NITROGLYCERIN 0.4 MG/1
0.4 TABLET SUBLINGUAL EVERY 5 MIN PRN
Qty: 25 TABLET | Refills: 3 | Status: SHIPPED | OUTPATIENT
Start: 2022-10-30

## 2022-10-30 RX ORDER — ATORVASTATIN CALCIUM 40 MG/1
40 TABLET, FILM COATED ORAL DAILY
Qty: 30 TABLET | Refills: 3 | Status: SHIPPED | OUTPATIENT
Start: 2022-10-31

## 2022-10-30 RX ORDER — LOSARTAN POTASSIUM 100 MG/1
100 TABLET ORAL DAILY
Qty: 90 TABLET | Refills: 1
Start: 2022-10-30

## 2022-10-30 RX ORDER — ASPIRIN 81 MG/1
81 TABLET, CHEWABLE ORAL DAILY
Qty: 30 TABLET | Refills: 3 | Status: SHIPPED | OUTPATIENT
Start: 2022-10-31

## 2022-10-30 RX ADMIN — ASPIRIN 81 MG 81 MG: 81 TABLET ORAL at 09:10

## 2022-10-30 RX ADMIN — PAROXETINE HYDROCHLORIDE 40 MG: 20 TABLET, FILM COATED ORAL at 09:10

## 2022-10-30 RX ADMIN — ATENOLOL 50 MG: 25 TABLET ORAL at 09:10

## 2022-10-30 RX ADMIN — ATORVASTATIN CALCIUM 40 MG: 40 TABLET, FILM COATED ORAL at 09:10

## 2022-10-30 RX ADMIN — SODIUM CHLORIDE, PRESERVATIVE FREE 10 ML: 5 INJECTION INTRAVENOUS at 09:12

## 2022-10-30 RX ADMIN — PANTOPRAZOLE SODIUM 40 MG: 40 TABLET, DELAYED RELEASE ORAL at 06:59

## 2022-10-30 ASSESSMENT — PAIN SCALES - GENERAL
PAINLEVEL_OUTOF10: 0
PAINLEVEL_OUTOF10: 0

## 2022-10-30 NOTE — DISCHARGE SUMMARY
Hospital Medicine Discharge Summary    Patient ID: Shane Torrez      Patient's PCP: No primary care provider on file. Admit Date: 10/28/2022     Discharge Date: 10/30/2022      Admitting Provider: Lyndsey Cifuentes MD     Discharge Provider: Loren Hughes MD     Discharge Diagnoses: Active Hospital Problems    Diagnosis     Abnormal cardiovascular stress test [R94.39]      Priority: Medium    Alcohol dependence, daily use ([address] [F10.20]      Priority: Medium    Tobacco abuse [Z72.0]      Priority: Medium    High cholesterol [E78.00]      Priority: Medium    Hypertension [I10]      Priority: Medium    Chest pain [R07.9]      Priority: Medium       The patient was seen and examined on day of discharge and this discharge summary is in conjunction with any daily progress note from day of discharge. Hospital Course:   History Of Present Illness:       Shane Torrez is a 78-year-old male with significant past medical history of hypertension, hyperlipidemia, and depression who presents to [facility] emergency department with concerns for an episode of chest pain that occurred today. He states that he was at home, developed an acute onset of chest pressure around noon, substernal, nonradiating, felt like a pressure sensation pushing down to his chest, with no associated symptoms such as tachycardia, palpitations, or dizziness. He rated the pain/pressure at about a 5 out of 10, would wax and wane throughout the day, but was able to continue on with his daily tasks such as going  the kids. He states when he got back home, his wife was home as well, told her that his chest felt funny, at that time the pain was waning to about a 2 out of 10 but felt compelled to come here for an evaluation. His evaluation here included laboratory studies, EKG, and chest x-ray. Chest x-ray was negative for any acute findings. EKG showed sinus rhythm without any acute ST segment T wave deviations. Pertinent lab values show an unremarkable chemistry panel, negative serial troponin checks, and proBNP of 25. Cell count was also unremarkable for any acute leukocytosis/leukopenia, acute anemia, or acute platelet abnormalities. Flu and COVID testing were both negative as well. Patient received aspirin emergency department. Hospitalist consulted to place this patient in observation for chest pain rule out ACS. Chest Pain  - Pain-free currently  - Continued troponin checks  - Consulted Cardiology for AM, apprec recs  - Ordered echocardiogram(cancelled given bnp wnl)   -ordered stress testing and abn, pt declined invasive testing and requested 2nd opinion, pt was dc'd with medical tx(asa/bb/high dose statin)     Chronic Alcohol Use  - Drinks 2 double bourbons with cola daily before bed  - Denies previous alcohol treatment or concerns for withdrawal symptoms while here, last drink last night  - Discussed alcohol intake is excessive, denies concerns, states it helps him sleep  - CIWA protocol placed   -ppi ordered 10/29     Hypertension  - BP controlled  - Continue home antihypertensives   -resumed arb on dc    Hyperlipidemia  - Continue statin therapy  - Checked lipid panel in AM     Nicotine Use  - 1/2 ppd  - Nicoderm patch ordered, if needed    Anxiety- defer to outpt mgmt    Physical Exam Performed:     BP (!) 158/105   Pulse 58   Temp 97.5 °F (36.4 °C) (Oral)   Resp 16   Ht 6' 1\" (1.854 m)   Wt 222 lb 4.8 oz (100.8 kg)   SpO2 99%   BMI 29.33 kg/m²       General appearance:  No apparent distress, appears stated age and cooperative. HEENT:  Normal cephalic, atraumatic without obvious deformity. Pupils equal, round, and reactive to light. Extra ocular muscles intact. Conjunctivae/corneas clear. Neck: Supple, with full range of motion. No jugular venous distention. Trachea midline. Respiratory:  Normal respiratory effort.  Clear to auscultation, bilaterally without Rales/Wheezes/Rhonchi. Cardiovascular:  Regular rate and rhythm with normal S1/S2 without murmurs, rubs or gallops. Abdomen: Soft, non-tender, non-distended with normal bowel sounds. Musculoskeletal:  No clubbing, cyanosis or edema bilaterally. Full range of motion without deformity. Skin: Skin color, texture, turgor normal.  No rashes or lesions. Neurologic:  Neurovascularly intact without any focal sensory/motor deficits. Cranial nerves: II-XII intact, grossly non-focal.  Psychiatric:  Alert and oriented, thought content appropriate, normal insight  Capillary Refill: Brisk,< 3 seconds   Peripheral Pulses: +2 palpable, equal bilaterally       Labs: For convenience and continuity at follow-up the following most recent labs are provided:      CBC:    Lab Results   Component Value Date/Time    WBC 10.7 10/28/2022 05:07 PM    HGB 16.2 10/28/2022 05:07 PM    HCT 47.6 10/28/2022 05:07 PM     10/28/2022 05:07 PM       Renal:    Lab Results   Component Value Date/Time     10/29/2022 06:56 AM    K 4.3 10/29/2022 06:56 AM    K 3.7 10/28/2022 05:51 PM     10/29/2022 06:56 AM    CO2 26 10/29/2022 06:56 AM    BUN 11 10/29/2022 06:56 AM    CREATININE 1.0 10/29/2022 06:56 AM    CALCIUM 9.4 10/29/2022 06:56 AM         Significant Diagnostic Studies    Radiology:   NM Cardiac Stress Test Nuclear Imaging   Final Result      XR CHEST PORTABLE   Final Result   No radiographic evidence of an acute cardiopulmonary process.                 Consults:     IP CONSULT TO CARDIOLOGY    Disposition:  home     Condition at Discharge: Stable    Discharge Instructions/Follow-up:  PCP as outpt(arranged 2nd cardiology opinion )    Code Status:  Full Code     Activity: activity as tolerated    Diet: cardiac diet      Discharge Medications:     Discharge Medication List as of 10/30/2022  1:00 PM             Details   aspirin 81 MG chewable tablet Take 1 tablet by mouth daily, Disp-30 tablet, R-3Normal      losartan (COZAAR) 100 MG tablet Take 1 tablet by mouth daily, Disp-90 tablet, R-1NO PRINT      nitroGLYCERIN (NITROSTAT) 0.4 MG SL tablet Place 1 tablet under the tongue every 5 minutes as needed for Chest pain up to max of 3 total doses. If no relief after 1 dose, call 911., Disp-25 tablet, R-3Normal                Details   atorvastatin (LIPITOR) 40 MG tablet Take 1 tablet by mouth daily, Disp-30 tablet, R-3Normal                Details   atenolol (TENORMIN) 50 MG tablet Take 50 mg by mouth dailyHistorical Med      PARoxetine (PAXIL) 40 MG tablet Take 40 mg by mouth every morningHistorical Med      Pilocarpine HCl (VUITY) 1.25 % SOLN Apply to eyeHistorical Med      ammonium lactate (LAC-HYDRIN) 12 % lotion Apply topically as needed for Dry Skin Apply topically as needed., Topical, PRN, Historical Med             Time Spent on discharge is more than 30 minutes in the examination, evaluation, counseling and review of medications and discharge plan. Signed:    Merlin Rodriguez MD   10/30/2022      Thank you No primary care provider on file. for the opportunity to be involved in this patient's care. If you have any questions or concerns, please feel free to contact me at [phone].

## 2022-10-30 NOTE — DISCHARGE INSTRUCTIONS
Your information:  Name: Eron Mcmullen  : 1980    Your instructions:    -Follow up with cardiology as outpatient in the next 1 week for second opinion  -Stop Smoking    What to do after you leave the hospital:    Recommended diet: regular diet, low salt    Recommended activity: activity as tolerated        The following personal items were collected during your admission and were returned to you:    Belongings  Dental Appliances: None  Vision - Corrective Lenses: Contact Lenses  Hearing Aid: None  Clothing: Pants  Jewelry: None  Body Piercings Removed: No  Electronic Devices: Cell Phone  Weapons (Notify Protective Services/Security): None  Other Valuables: At home  Home Medications: None  Valuables Given To: Other (Comment), Family (Comment)  Provide Name(s) of Who Valuable(s) Were Given To: juan [phone]  Responsible person(s) in the waiting room: no  Patient approves for provider to speak to responsible person post operatively: No    Information obtained by:  By signing below, I understand that if any problems occur once I leave the hospital I am to contact my primary care doctor. I understand and acknowledge receipt of the instructions indicated above.

## 2022-10-30 NOTE — PROGRESS NOTES
Patient given discharge instructions and voiced understanding. Patient discharged in stable condition with all belongings. To car via wheelchair. Home with wife.

## 2022-10-30 NOTE — PROGRESS NOTES
[facility] 79   Daily Progress Note    Admit Date:  10/28/2022  HPI:    Chief Complaint   Patient presents with    Chest Pain     Started around 1300, mid-chest with pressure. Describes dizziness with nausea. Wife describes the pt as \"looking yellow\". Michael Nguyen presented with chest pain that waxed and waned throughout the day, occurred with increased stress. PMH of hypertension, hyperlipidemia, non-smoker, EtOH use    Subjective:  Mr. Carol Boyle sitting up in bed, denies any chest pain since admission. Reviewed stress test, EKG's and troponin's. Patient would like to discharge and obtain second opinion.      Objective:   Patient Vitals for the past 24 hrs:   BP Temp Temp src Pulse Resp SpO2 Weight   10/30/22 0818 (!) 147/90 98.2 °F (36.8 °C) Oral 63 18 99 % --   10/30/22 0542 -- -- -- -- -- -- 222 lb 4.8 oz (100.8 kg)   10/30/22 0404 (!) 138/90 98.6 °F (37 °C) Oral 60 18 99 % --   10/29/22 2359 (!) 145/99 -- -- 66 -- 98 % --   10/29/22 2357 (!) 153/93 98.8 °F (37.1 °C) Oral 68 17 98 % --   10/29/22 2024 (!) 156/93 99.7 °F (37.6 °C) Oral 65 18 97 % --   10/29/22 1633 (!) 159/90 98.1 °F (36.7 °C) Oral 71 18 97 % --   10/29/22 1218 136/89 99 °F (37.2 °C) Oral 70 18 100 % --       Intake/Output Summary (Last 24 hours) at 10/30/2022 1054  Last data filed at 10/29/2022 1840  Gross per 24 hour   Intake 960 ml   Output --   Net 960 ml     Wt Readings from Last 3 Encounters:   10/30/22 222 lb 4.8 oz (100.8 kg)         ASSESSMENT:   Chest pain: Negative troponins, EKG with nonspecific ST-T wave changes, abnormal stress test  HTN: Sub-optimal, had been on losartan 100 mg daily at home - resume, continue atenolol 50 mg daily  HLD: On statin (dose increased)  Tobacco use  Alcohol use  Family history of premature CAD in maternal grandmother      PLAN:  Continue ASA, high intensity statin and beta blocker   Add sl nitroglycerin prn chest pain   Resume losartan 100 mg daily at discharge  Okay for discharge from cardiac perspective. Will review cardiac medications on discharge medication reconciliation form. Patient would like to obtain 2 nd consult at [facility]. Encouraged to contact our office if further needs. Diane ZAIRA Clinton CNP, 10/30/2022, 10:54 AM  [place]   [phone]       Telemetry: SR 60-70's  NYHA: III    Physical Exam:  General:  Awake, alert, NAD  Skin:  Warm and dry  Neck:  JVP normal  Chest:  Clear to auscultation   Cardiovascular:  RRR, normal S1S2, no m/g/r  Abdomen:  Soft, nontender, +bowel sounds  Extremities:  No BLE edema        Medications:    pantoprazole  40 mg Oral QAM AC    atorvastatin  40 mg Oral Daily    atenolol  50 mg Oral Daily    PARoxetine  40 mg Oral QAM    sodium chloride flush  5-40 mL IntraVENous 2 times per day    aspirin  81 mg Oral Daily    nicotine  1 patch TransDERmal Daily      sodium chloride         Lab Data: Lab results independently reviewed and analyzed by myself 10/30/2022    CBC:   Recent Labs     10/28/22  1707   WBC 10.7   HGB 16.2        BMP:    Recent Labs     10/28/22  1751 10/29/22  0656    141   K 3.7 4.3   CO2 23 26   BUN 13 11   CREATININE 0.9 1.0     INR:  No results for input(s): INR in the last 72 hours. BNP:    Recent Labs     10/28/22  1751   PROBNP 25     Cardiac Enzymes:   Recent Labs     10/28/22  1751 10/28/22  2015 10/29/22  0040   TROPONINI <0.01 <0.01 <0.01     Lipids: No results found for: TRIG, HDL, LDLCALC, LDLDIRECT    Cardiac Imaging:   Exercise nuclear stress test 10/29/2022:   Summary    Small sized anteroseptal and basal lateral completely reversible defects    consistent with ischemia in the territory of the mid LAD and/or CCx. Hyperdynamic LV systolic function with AQ>57% with uniform wall motion. Intermediate risk abnormal study.      EKG 10/29/22 0744  Normal sinus rhythmNormal ECGWhen compared with ECG of 28-OCT-2022 16:57,No significant change was foundConfirmed by HÉCTOR Tuttle MD ([id number]) on 10/29/2022 3:47:30 PM    EKG 10/28/2022 1657  Normal sinus rhythmNonspecific T wave abnormalityConfirmed by Shanon Murcia ([id number]) on 10/28/2022 6:28:26 PM

## 2022-10-30 NOTE — PROGRESS NOTES
End of shift report given to [facility]. Call light within reach, bed in lowest position, no needs at this time.

## 2022-10-30 NOTE — PLAN OF CARE
Problem: Safety - Adult  Goal: Free from fall injury  Outcome: Progressing   Encouraged to call for assistance if needed. Non skid slipper socks on. Problem: Pain  Goal: Verbalizes/displays adequate comfort level or baseline comfort level  10/30/2022 1006 by Chuy Grant RN  Outcome: Progressing   Monitor for chest pain every 4 hours and prn.